# Patient Record
Sex: FEMALE | Race: BLACK OR AFRICAN AMERICAN | NOT HISPANIC OR LATINO | URBAN - METROPOLITAN AREA
[De-identification: names, ages, dates, MRNs, and addresses within clinical notes are randomized per-mention and may not be internally consistent; named-entity substitution may affect disease eponyms.]

---

## 2021-05-26 ENCOUNTER — OFFICE VISIT (OUTPATIENT)
Dept: URBAN - METROPOLITAN AREA CLINIC 118 | Facility: CLINIC | Age: 61
End: 2021-05-26

## 2021-07-07 ENCOUNTER — OFFICE VISIT (OUTPATIENT)
Dept: URBAN - METROPOLITAN AREA CLINIC 118 | Facility: CLINIC | Age: 61
End: 2021-07-07
Payer: COMMERCIAL

## 2021-07-07 VITALS
DIASTOLIC BLOOD PRESSURE: 85 MMHG | HEART RATE: 63 BPM | TEMPERATURE: 98.4 F | BODY MASS INDEX: 26.65 KG/M2 | WEIGHT: 169.8 LBS | SYSTOLIC BLOOD PRESSURE: 146 MMHG | HEIGHT: 67 IN

## 2021-07-07 DIAGNOSIS — K62.89 RECTAL NODULE: ICD-10-CM

## 2021-07-07 DIAGNOSIS — K64.2 GRADE III INTERNAL HEMORRHOIDS: ICD-10-CM

## 2021-07-07 DIAGNOSIS — Z86.010 PERSONAL HISTORY OF COLON POLYPS: ICD-10-CM

## 2021-07-07 DIAGNOSIS — K62.3 RECTAL MUCOSA PROLAPSE: ICD-10-CM

## 2021-07-07 PROCEDURE — 99213 OFFICE O/P EST LOW 20 MIN: CPT | Performed by: INTERNAL MEDICINE

## 2021-07-07 PROCEDURE — 46221 LIGATION OF HEMORRHOID(S): CPT | Performed by: INTERNAL MEDICINE

## 2021-07-07 NOTE — EXAM-PHYSICAL EXAM
Anamaria in room as chaperone  prolapsed grade 3 internal hemorrhoids rectal exam with nodularity and firmness of the post rectal mucosa anoscopy with self lighted anoscope showed inflammation and edema and nodularity of the posterior rectal mucosa, which is what had prolapsed out smaller visible grade 2 RA seen  Banded RA without difficulty using [facility] hemorrhoid banding system after reviewing rba

## 2021-07-07 NOTE — HPI-TODAY'S VISIT:
last seen 1.5 years ago hemorrhoids with swelling and rare blood in the bowl constipation intermittantly now, not eating as much fiber as she knows she should  last colon 1/2020 with me had single adenomatous polyp repeat 5 years, and hemorrhoids

## 2021-07-26 ENCOUNTER — OFFICE VISIT (OUTPATIENT)
Dept: URBAN - METROPOLITAN AREA CLINIC 118 | Facility: CLINIC | Age: 61
End: 2021-07-26
Payer: COMMERCIAL

## 2021-07-26 ENCOUNTER — DASHBOARD ENCOUNTERS (OUTPATIENT)
Age: 61
End: 2021-07-26

## 2021-07-26 VITALS
SYSTOLIC BLOOD PRESSURE: 145 MMHG | BODY MASS INDEX: 26.87 KG/M2 | TEMPERATURE: 97.6 F | WEIGHT: 171.2 LBS | DIASTOLIC BLOOD PRESSURE: 93 MMHG | HEIGHT: 67 IN | HEART RATE: 70 BPM

## 2021-07-26 DIAGNOSIS — K62.89 RECTAL NODULE: ICD-10-CM

## 2021-07-26 DIAGNOSIS — K64.2 GRADE III INTERNAL HEMORRHOIDS: ICD-10-CM

## 2021-07-26 DIAGNOSIS — Z86.010 PERSONAL HISTORY OF COLON POLYPS: ICD-10-CM

## 2021-07-26 DIAGNOSIS — K62.3 RECTAL MUCOSA PROLAPSE: ICD-10-CM

## 2021-07-26 PROBLEM — 428283002 HISTORY OF POLYP OF COLON: Status: ACTIVE | Noted: 2021-07-07

## 2021-07-26 PROCEDURE — 46611 ANOSCOPY: CPT | Performed by: INTERNAL MEDICINE

## 2021-07-26 PROCEDURE — 99212 OFFICE O/P EST SF 10 MIN: CPT | Performed by: INTERNAL MEDICINE

## 2021-07-26 NOTE — HPI-TODAY'S VISIT:
here for f/u anoscopy re-eval as had concerning nodularity in anosocpy but thought may be due to prolapsed hemorrhoid hemorrhoids with swelling and rare blood in the bowl last visit is improving  constipation improving with fiber   last colon 1/2020 with me had single adenomatous polyp repeat 5 years, and hemorrhoids

## 2021-07-26 NOTE — EXAM-PHYSICAL EXAM
Anamaria in room as chaperone  prolapsed grade 3 internal hemorrhoids rectal exam with nodularity and firmness of the post rectal mucosa anoscopy with self lighted anoscope showed inflammation and edema and nodularity of the posterior rectal mucosa, which is what had prolapsed out smaller visible grade 2 RP seen